# Patient Record
Sex: MALE | Race: WHITE | Employment: FULL TIME | URBAN - METROPOLITAN AREA
[De-identification: names, ages, dates, MRNs, and addresses within clinical notes are randomized per-mention and may not be internally consistent; named-entity substitution may affect disease eponyms.]

---

## 2017-04-24 DIAGNOSIS — Z53.9 ERRONEOUS ENCOUNTER--DISREGARD: Primary | ICD-10-CM

## 2017-04-24 DIAGNOSIS — C49.9 SARCOMA (H): Primary | ICD-10-CM

## 2017-05-02 ENCOUNTER — OFFICE VISIT (OUTPATIENT)
Dept: ORTHOPEDICS | Facility: CLINIC | Age: 42
End: 2017-05-02

## 2017-05-02 ENCOUNTER — HOSPITAL ENCOUNTER (OUTPATIENT)
Dept: PET IMAGING | Facility: CLINIC | Age: 42
Discharge: HOME | End: 2017-05-02
Attending: ORTHOPAEDIC SURGERY | Admitting: ORTHOPAEDIC SURGERY
Payer: COMMERCIAL

## 2017-05-02 VITALS — BODY MASS INDEX: 25.16 KG/M2 | WEIGHT: 166 LBS | HEIGHT: 68 IN

## 2017-05-02 DIAGNOSIS — C49.9 SARCOMA (H): ICD-10-CM

## 2017-05-02 DIAGNOSIS — Z53.9 ERRONEOUS ENCOUNTER--DISREGARD: Primary | ICD-10-CM

## 2017-05-02 LAB — GLUCOSE BLDC GLUCOMTR-MCNC: 92 MG/DL (ref 70–99)

## 2017-05-02 PROCEDURE — 25500064 ZZH RX 255 OP 636: Performed by: ORTHOPAEDIC SURGERY

## 2017-05-02 PROCEDURE — 78816 PET IMAGE W/CT FULL BODY: CPT | Mod: PS

## 2017-05-02 PROCEDURE — 82962 GLUCOSE BLOOD TEST: CPT

## 2017-05-02 PROCEDURE — A9552 F18 FDG: HCPCS | Performed by: ORTHOPAEDIC SURGERY

## 2017-05-02 PROCEDURE — 71260 CT THORAX DX C+: CPT

## 2017-05-02 PROCEDURE — 34300033 ZZH RX 343: Performed by: ORTHOPAEDIC SURGERY

## 2017-05-02 RX ORDER — IOPAMIDOL 755 MG/ML
45-150 INJECTION, SOLUTION INTRAVASCULAR ONCE
Status: COMPLETED | OUTPATIENT
Start: 2017-05-02 | End: 2017-05-02

## 2017-05-02 RX ADMIN — IOPAMIDOL 88 ML: 755 INJECTION, SOLUTION INTRAVENOUS at 15:06

## 2017-05-02 RX ADMIN — FLUDEOXYGLUCOSE F-18 10.28 MCI.: 500 INJECTION, SOLUTION INTRAVENOUS at 14:15

## 2017-05-02 NOTE — NURSING NOTE
"Reason For Visit:   Chief Complaint   Patient presents with     Results     F/u Anual PET Scan done today.                Ht 1.727 m (5' 8\")  Wt 75.3 kg (166 lb)  BMI 25.24 kg/m2                        Pain Assessment  Patient Currently in Pain: No                         No current outpatient prescriptions on file.     No current facility-administered medications for this visit.        No Known Allergies    Daisy Lowry C.M.A.             "

## 2017-05-02 NOTE — LETTER
5/2/2017       RE: Mayank Pradhan  [address] [facility] [address]     Dear Colleague,    Thank you for referring your patient, Mayank Pradhan, to the [facility] at [facility] Please see a copy of my visit note below.      This encounter was opened in error. Please disregard.    Again, thank you for allowing me to participate in the care of your patient.      Sincerely,    Alex Mcnamara MD

## 2017-05-02 NOTE — MR AVS SNAPSHOT
"              After Visit Summary   2017    Mayank Pradhan    MRN: [id number]           Patient Information     Date Of Birth          1975        Visit Information        Provider Department      2017 4:15 PM Alex Mcnamara MD [facility]        Today's Diagnoses     ERRONEOUS ENCOUNTER--DISREGARD    -  1       Follow-ups after your visit        Who to contact     Please call your clinic at [phone] to:    Ask questions about your health    Make or cancel appointments    Discuss your medicines    Learn about your test results    Speak to your doctor   If you have compliments or concerns about an experience at your clinic, or if you wish to file a complaint, please contact [facility] Patient Relations at [phone] or email us at [email or web address]         Additional Information About Your Visit        MyChart Information     bluebird bio is an electronic gateway that provides easy, online access to your medical records. With bluebird bio, you can request a clinic appointment, read your test results, renew a prescription or communicate with your care team.     To sign up for [email or web address] visit the website at [email or web address]   You will be asked to enter the access code listed below, as well as some personal information. Please follow the directions to create your username and password.     Your access code is: [email or web address]  Expires: 2017  6:30 AM     Your access code will  in 90 days. If you need help or a new code, please contact your [facility] or call [phone] for assistance.        Care EveryWhere ID     This is your Care EveryWhere ID. This could be used by other organizations to access your [place] medical records  [id number]        Your Vitals Were     Height BMI (Body Mass Index)                1.727 m (5' 8\") 25.24 kg/m2           Blood Pressure from Last 3 Encounters:   13 " 120/74   07/17/13 122/69   07/12/13 127/73    Weight from Last 3 Encounters:   05/02/17 75.3 kg (166 lb)   12/22/15 74.4 kg (164 lb)   02/06/15 74.8 kg (165 lb)              Today, you had the following     No orders found for display       Primary Care Provider Office Phone # Fax #    Juan Ferrell [phone]       [facility] [address] [place] [address]        Thank you!     Thank you for choosing [facility]  for your care. Our goal is always to provide you with excellent care. Hearing back from our patients is one way we can continue to improve our services. Please take a few minutes to complete the written survey that you may receive in the mail after your visit with us. Thank you!             Your Updated Medication List - Protect others around you: Learn how to safely use, store and throw away your medicines at www.disposemymeds.org.      Notice  As of 5/2/2017 11:59 PM    You have not been prescribed any medications.